# Patient Record
Sex: FEMALE | Race: WHITE | NOT HISPANIC OR LATINO | URBAN - METROPOLITAN AREA
[De-identification: names, ages, dates, MRNs, and addresses within clinical notes are randomized per-mention and may not be internally consistent; named-entity substitution may affect disease eponyms.]

---

## 2018-04-24 ENCOUNTER — EMERGENCY (EMERGENCY)
Facility: HOSPITAL | Age: 78
LOS: 1 days | Discharge: HOME | End: 2018-04-24
Admitting: EMERGENCY MEDICINE
Payer: MEDICARE

## 2018-04-24 VITALS
OXYGEN SATURATION: 100 % | SYSTOLIC BLOOD PRESSURE: 144 MMHG | RESPIRATION RATE: 17 BRPM | DIASTOLIC BLOOD PRESSURE: 76 MMHG | WEIGHT: 119.93 LBS | TEMPERATURE: 98 F | HEART RATE: 76 BPM

## 2018-04-24 DIAGNOSIS — Z96.643 PRESENCE OF ARTIFICIAL HIP JOINT, BILATERAL: Chronic | ICD-10-CM

## 2018-04-24 PROCEDURE — 99283 EMERGENCY DEPT VISIT LOW MDM: CPT

## 2018-04-24 RX ORDER — CEFUROXIME AXETIL 250 MG
250 TABLET ORAL ONCE
Qty: 0 | Refills: 0 | Status: COMPLETED | OUTPATIENT
Start: 2018-04-24 | End: 2018-04-24

## 2018-04-24 RX ORDER — PHENAZOPYRIDINE HCL 100 MG
100 TABLET ORAL ONCE
Qty: 0 | Refills: 0 | Status: COMPLETED | OUTPATIENT
Start: 2018-04-24 | End: 2018-04-24

## 2018-04-24 RX ORDER — PHENAZOPYRIDINE HCL 100 MG
1 TABLET ORAL
Qty: 10 | Refills: 0 | OUTPATIENT
Start: 2018-04-24 | End: 2018-04-26

## 2018-04-24 RX ORDER — CEFUROXIME AXETIL 250 MG
1 TABLET ORAL
Qty: 13 | Refills: 0 | OUTPATIENT
Start: 2018-04-24 | End: 2018-04-30

## 2018-04-24 RX ADMIN — Medication 100 MILLIGRAM(S): at 08:40

## 2018-04-24 RX ADMIN — Medication 250 MILLIGRAM(S): at 09:14

## 2018-04-24 NOTE — ED PROVIDER NOTE - PMH
Breast cancer    Clostridium difficile diarrhea    Gastroesophageal reflux disease without esophagitis

## 2018-04-24 NOTE — ED PROVIDER NOTE - OBJECTIVE STATEMENT
77 y/o F presents to ED c/o dysuria, urinary frequency, urgency, and hematuria since last night.  Pt denies associated fevers/chills, n/v/d/c, back pain.  Pt denies recent antibiotics.

## 2018-04-24 NOTE — ED PROVIDER NOTE - MEDICAL DECISION MAKING DETAILS
79 y/o F presents with UTI.  Urine culture sent.  Will treat with Ceftin and Pyridium.  Pt given trial of medication in ED and observed for reaction (remote history of PCN allergy).  Small risk of cross reaction discussed with pt.  Discharged with Rx for Ceftin in the absence of rash or other complaints.

## 2018-04-28 DIAGNOSIS — N30.01 ACUTE CYSTITIS WITH HEMATURIA: ICD-10-CM

## 2018-04-28 DIAGNOSIS — Z88.0 ALLERGY STATUS TO PENICILLIN: ICD-10-CM

## 2018-04-28 DIAGNOSIS — R30.0 DYSURIA: ICD-10-CM
